# Patient Record
Sex: MALE | Race: WHITE | NOT HISPANIC OR LATINO | Employment: FULL TIME | URBAN - METROPOLITAN AREA
[De-identification: names, ages, dates, MRNs, and addresses within clinical notes are randomized per-mention and may not be internally consistent; named-entity substitution may affect disease eponyms.]

---

## 2017-05-17 ENCOUNTER — OFFICE VISIT (OUTPATIENT)
Dept: FAMILY MEDICINE CLINIC | Facility: CLINIC | Age: 42
End: 2017-05-17

## 2017-05-17 VITALS
WEIGHT: 171 LBS | OXYGEN SATURATION: 98 % | HEIGHT: 72 IN | SYSTOLIC BLOOD PRESSURE: 122 MMHG | BODY MASS INDEX: 23.16 KG/M2 | DIASTOLIC BLOOD PRESSURE: 88 MMHG | HEART RATE: 107 BPM

## 2017-05-17 DIAGNOSIS — S62.339A BOXER'S FRACTURE, CLOSED, INITIAL ENCOUNTER: Primary | ICD-10-CM

## 2017-05-17 PROCEDURE — 99213 OFFICE O/P EST LOW 20 MIN: CPT | Performed by: NURSE PRACTITIONER

## 2017-07-21 ENCOUNTER — OFFICE VISIT (OUTPATIENT)
Dept: FAMILY MEDICINE CLINIC | Facility: CLINIC | Age: 42
End: 2017-07-21

## 2017-07-21 VITALS
DIASTOLIC BLOOD PRESSURE: 70 MMHG | HEIGHT: 72 IN | SYSTOLIC BLOOD PRESSURE: 140 MMHG | BODY MASS INDEX: 23.03 KG/M2 | WEIGHT: 170 LBS | OXYGEN SATURATION: 100 % | HEART RATE: 120 BPM

## 2017-07-21 DIAGNOSIS — R94.31 EKG, ABNORMAL: Primary | ICD-10-CM

## 2017-07-21 DIAGNOSIS — R00.0 TACHYCARDIA: ICD-10-CM

## 2017-07-21 DIAGNOSIS — T67.1XXD HEAT SYNCOPE, SUBSEQUENT ENCOUNTER: ICD-10-CM

## 2017-07-21 DIAGNOSIS — R53.83 OTHER FATIGUE: ICD-10-CM

## 2017-07-21 PROCEDURE — 99214 OFFICE O/P EST MOD 30 MIN: CPT | Performed by: FAMILY MEDICINE

## 2017-07-21 NOTE — PROGRESS NOTES
Subjective   Teddy King is a 41 y.o. male here to follow-up from the ER due to syncope.    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 He was taken to [facility] ER by ambulance. Teddy states this morning when he woke-up he felt intermittment heart palpitations and overall fatigue. He confirms chest pains, but only when the heart palpitations are present.  EKG reviewed and on chart - abnormal.The ED doc wanted him to stay overnight for evaluation but Teddy declined.  He was also dxd with dehydration.  The following portions of the patient's history were reviewed and updated as appropriate: allergies, current medications, past family history, past medical history, past social history and problem list.    Review of Systems   Constitutional: Positive for fatigue.   Cardiovascular: Positive for chest pain and palpitations.   Neurological: Positive for syncope, weakness and light-headedness.   All other systems reviewed and are negative.      Objective   Physical Exam   Constitutional: He is oriented to person, place, and time. He appears well-developed and well-nourished.   HENT:   Head: Normocephalic and atraumatic.   Nose: Nose normal.   Mouth/Throat: Oropharynx is clear and moist.   Eyes: Conjunctivae and EOM are normal. Pupils are equal, round, and reactive to light.   Neck: Normal range of motion. Neck supple. No JVD present. No thyromegaly present.   Cardiovascular: Normal rate, regular rhythm and normal heart sounds.  Exam reveals no gallop and no friction rub.    No murmur heard.  Tachy   Pulmonary/Chest: Breath sounds normal. He exhibits no tenderness.   Abdominal: Soft. He exhibits no distension. There is no tenderness.   Musculoskeletal: Normal range of motion.   Neurological: He is alert and oriented to person, place, and time.   Skin: Skin is warm and dry.   Psychiatric: He has a normal mood and affect. His behavior is normal.    Sad, tearful and anxious   Nursing note and vitals reviewed.      Assessment/Plan   Teddy was seen today for loss of consciousness.  ER record reviewed and on chart.  Diagnoses and all orders for this visit:    EKG, abnormal  Reviewed and part of ER report. I have referred him to cardiology for evaluation.  He has a strong family hx of heart disease.    Heat syncope, subsequent encounter  Advised increasing fluids and staying out of the heat as much as possible.    Other fatigue  Most likely due to above.    Tachycardia  HR of 120 that is regular. He denies having any caffeine today ]

## 2017-08-17 ENCOUNTER — OFFICE VISIT (OUTPATIENT)
Dept: FAMILY MEDICINE CLINIC | Facility: CLINIC | Age: 42
End: 2017-08-17

## 2017-08-17 VITALS
RESPIRATION RATE: 16 BRPM | BODY MASS INDEX: 23.03 KG/M2 | SYSTOLIC BLOOD PRESSURE: 134 MMHG | DIASTOLIC BLOOD PRESSURE: 80 MMHG | OXYGEN SATURATION: 98 % | WEIGHT: 170 LBS | HEART RATE: 106 BPM | HEIGHT: 72 IN

## 2017-08-17 DIAGNOSIS — Z00.00 ROUTINE GENERAL MEDICAL EXAMINATION AT A HEALTH CARE FACILITY: Primary | ICD-10-CM

## 2017-08-17 DIAGNOSIS — R00.2 PALPITATION: ICD-10-CM

## 2017-08-17 DIAGNOSIS — F41.9 ANXIETY: ICD-10-CM

## 2017-08-17 DIAGNOSIS — R00.0 RAPID HEART RATE: ICD-10-CM

## 2017-08-17 DIAGNOSIS — G47.9 DISTURBANCE OF SLEEP: ICD-10-CM

## 2017-08-17 LAB
ALBUMIN SERPL-MCNC: 4.7 G/DL (ref 3.5–5.2)
ALBUMIN/GLOB SERPL: 1.6 G/DL
ALP SERPL-CCNC: 75 U/L (ref 39–117)
ALT SERPL-CCNC: 26 U/L (ref 1–41)
AST SERPL-CCNC: 32 U/L (ref 1–40)
BILIRUB SERPL-MCNC: 0.3 MG/DL (ref 0.1–1.2)
BUN SERPL-MCNC: 12 MG/DL (ref 6–20)
BUN/CREAT SERPL: 16.4 (ref 7–25)
CALCIUM SERPL-MCNC: 9.8 MG/DL (ref 8.6–10.5)
CHLORIDE SERPL-SCNC: 100 MMOL/L (ref 98–107)
CO2 SERPL-SCNC: 27.7 MMOL/L (ref 22–29)
CREAT SERPL-MCNC: 0.73 MG/DL (ref 0.76–1.27)
ERYTHROCYTE [DISTWIDTH] IN BLOOD BY AUTOMATED COUNT: 13.6 % (ref 11.5–14.5)
GLOBULIN SER CALC-MCNC: 3 GM/DL
GLUCOSE SERPL-MCNC: 96 MG/DL (ref 65–99)
HCT VFR BLD AUTO: 42.8 % (ref 40.4–52.2)
HGB BLD-MCNC: 14.4 G/DL (ref 13.7–17.6)
MCH RBC QN AUTO: 34 PG (ref 27–32.7)
MCHC RBC AUTO-ENTMCNC: 33.6 G/DL (ref 32.6–36.4)
MCV RBC AUTO: 101.2 FL (ref 79.8–96.2)
PLATELET # BLD AUTO: 297 10*3/MM3 (ref 140–500)
POTASSIUM SERPL-SCNC: 4.5 MMOL/L (ref 3.5–5.2)
PROT SERPL-MCNC: 7.7 G/DL (ref 6–8.5)
RBC # BLD AUTO: 4.23 10*6/MM3 (ref 4.6–6)
SODIUM SERPL-SCNC: 144 MMOL/L (ref 136–145)
TSH SERPL DL<=0.005 MIU/L-ACNC: 0.75 MIU/ML (ref 0.27–4.2)
WBC # BLD AUTO: 9.56 10*3/MM3 (ref 4.5–10.7)

## 2017-08-17 PROCEDURE — 99213 OFFICE O/P EST LOW 20 MIN: CPT | Performed by: FAMILY MEDICINE

## 2017-08-17 PROCEDURE — 99395 PREV VISIT EST AGE 18-39: CPT | Performed by: FAMILY MEDICINE

## 2017-08-17 RX ORDER — CARVEDILOL 3.12 MG/1
3.12 TABLET ORAL 2 TIMES DAILY WITH MEALS
COMMUNITY
End: 2019-03-06

## 2017-08-17 NOTE — PATIENT INSTRUCTIONS
Annual Wellness  Personal Prevention Plan of Service   I will call you with lab results.  Date of Office Visit:  2017  Encounter Provider:  Mario Poon MD  Place of Service:  [facility]  Patient Name: Teddy King  :  1975    As part of the Annual Wellness portion of your visit today, we are providing you with this personalized preventive plan of services (PPPS). This plan is based upon recommendations of the [place] Preventive Services Task Force (USPSTF) and the Advisory Committee on Immunization Practices (ACIP).    This lists the preventive care services that should be considered, and provides dates of when you are due. Items listed as completed are up-to-date and do not require any further intervention.    Health Maintenance   Topic Date Due   • INFLUENZA VACCINE  2017   • TDAP/TD VACCINES (2 - Td) 2025   • PNEUMOCOCCAL VACCINE (19-64 MEDIUM RISK)  Excluded       Orders Placed This Encounter   Procedures   • CBC (No Diff)     Order Specific Question:   [facility] Has the patient fasted?     Answer:   Yes   • Comprehensive Metabolic Panel     Order Specific Question:   [facility] Has the patient fasted?     Answer:   Yes   • Lipid Panel With / Chol / HDL Ratio   • TSH     Order Specific Question:   [facility] Has the patient fasted?     Answer:   Yes   • Ambulatory Referral to Sleep Medicine     Referral Priority:   Routine     Referral Type:   Consultation     Referral Reason:   Specialty Services Required     Referred to Provider:   Jong Kirkpatrick MD     Requested Specialty:   Sleep Medicine     Number of Visits Requested:   1   • Ambulatory Referral to Psychiatry     Referral Priority:   Routine     Referral Type:   Behavorial Health/Psych     Referral Reason:   Specialty Services Required     Referred to Provider:   Gerhard Cid MD     Requested Specialty:   Psychiatry     Number of Visits Requested:   1       Return in about 6 months (around  2/17/2018) for Recheck.

## 2017-08-17 NOTE — PROGRESS NOTES
"  Subjective   Teddy King  is a 41 y.o. male.   He is here for check up and RHM.     History of Present Illness   He is fasting today for RHM labs.    He is still struggling emotionally and is under a great amount of stress. He would like to get some help for the intense anxiety he feels. He has not worked with a psychiatrist or therapist but is ready to do so.  He was seen by cardiology for rapid heart rate and palpitations and medications were adjusted. He was supposed to get a sleep study but that has not been ordered yet. He feels he does not sleep well and wonders if this is the source of his cardiac problems.    The following portions of the patient's history were reviewed and updated as appropriate: allergies, current medications, past family history, past medical history, past social history, past surgical history and problem list.    Review of Systems   Constitutional: Negative.    HENT: Negative.    Eyes: Negative.    Respiratory: Negative.    Cardiovascular: Negative.    Gastrointestinal: Negative.    Endocrine: Negative.    Genitourinary: Negative.    Musculoskeletal: Negative for neck stiffness.   Skin: Negative.    Allergic/Immunologic: Negative.    Neurological: Negative.    Hematological: Negative.    Psychiatric/Behavioral: Negative.    All other systems reviewed and are negative.      Objective   Vitals:    08/17/17 0927   BP: 134/80   Pulse: 106   Resp: 16   SpO2: 98%   Weight: 170 lb (77.1 kg)   Height: 71.5\" (181.6 cm)     Physical Exam   Constitutional: He is oriented to person, place, and time. He appears well-developed and well-nourished.   HENT:   Head: Normocephalic and atraumatic.   Eyes: Conjunctivae and EOM are normal. Pupils are equal, round, and reactive to light.   Neck: Normal range of motion. Neck supple. No thyromegaly present.   Cardiovascular: Slightly elevated rate, regular rhythm, normal heart sounds and intact distal pulses.  Exam reveals no gallop and no friction rub.    No " murmur heard.  Pulmonary/Chest: Effort normal and breath sounds normal. No respiratory distress. He has no wheezes. He has no rales. He exhibits no tenderness.   Abdominal: Soft, non-tender, non-distended.   Musculoskeletal: Upper and Lower extremities have normal range of motion. He exhibits no edema.   Neurological: He is alert and oriented to person, place, and time.   Skin: Skin is warm and dry. No rash noted.   Psychiatric: He has a normal mood and affect. His behavior is normal. Judgment and thought content normal.   Nursing note and vitals reviewed.      Assessment/Plan   Teddy King was seen today for annual exam.  1. Routine general medical examination at a [facility] care facility  RHM reviewed with Teddy and updated.  - CBC (No Diff)  - Comprehensive Metabolic Panel  - Lipid Panel With / Chol / HDL Ratio    2. Disturbance of sleep  - Ambulatory Referral to Sleep Medicine    3. Anxiety    - Ambulatory Referral to Psychiatry    4. Palpitation and Rapid Heart Rate  TSH will be checked today to r/o thyroid problems.  - TSH  Orders Placed This Encounter   Procedures   • CBC (No Diff)     Order Specific Question:   [facility] Has the patient fasted?     Answer:   Yes   • Comprehensive Metabolic Panel     Order Specific Question:   [facility] Has the patient fasted?     Answer:   Yes   • Lipid Panel With / Chol / HDL Ratio   • TSH     Order Specific Question:   [facility] Has the patient fasted?     Answer:   Yes   • Ambulatory Referral to Sleep Medicine     Referral Priority:   Routine     Referral Type:   Consultation     Referral Reason:   Specialty Services Required     Referred to Provider:   Jong Kirkpatrick MD     Requested Specialty:   Sleep Medicine     Number of Visits Requested:   1   • Ambulatory Referral to Psychiatry     Referral Priority:   Routine     Referral Type:   Behavorial Health/Psych     Referral Reason:   Specialty Services Required     Referred to Provider:   Gerhard Cid MD     Requested  Specialty:   Psychiatry     Number of Visits Requested:   1       Return in about 6 months (around 2/17/2018) for Recheck.

## 2017-08-18 LAB
CHOLEST SERPL-MCNC: 257 MG/DL (ref 0–200)
CHOLEST/HDLC SERPL: 3.29 {RATIO}
HDLC SERPL-MCNC: 78 MG/DL (ref 40–60)
LDLC SERPL CALC-MCNC: ABNORMAL MG/DL
TRIGL SERPL-MCNC: 431 MG/DL (ref 0–150)
VLDLC SERPL CALC-MCNC: ABNORMAL MG/DL

## 2017-09-13 ENCOUNTER — OFFICE VISIT (OUTPATIENT)
Dept: FAMILY MEDICINE CLINIC | Facility: CLINIC | Age: 42
End: 2017-09-13

## 2017-09-13 VITALS
OXYGEN SATURATION: 98 % | HEIGHT: 71 IN | SYSTOLIC BLOOD PRESSURE: 142 MMHG | DIASTOLIC BLOOD PRESSURE: 94 MMHG | BODY MASS INDEX: 23.52 KG/M2 | HEART RATE: 99 BPM | TEMPERATURE: 98.4 F | WEIGHT: 168 LBS

## 2017-09-13 DIAGNOSIS — R19.7 DIARRHEA, UNSPECIFIED TYPE: ICD-10-CM

## 2017-09-13 DIAGNOSIS — R11.2 NON-INTRACTABLE VOMITING WITH NAUSEA, UNSPECIFIED VOMITING TYPE: Primary | ICD-10-CM

## 2017-09-13 PROCEDURE — 99213 OFFICE O/P EST LOW 20 MIN: CPT | Performed by: FAMILY MEDICINE

## 2017-09-13 NOTE — PROGRESS NOTES
Subjective   Teddy King is a 41 y.o. male.     History of Present Illness   Teddy is here today with c/o nausea & vomiting, loose stool, dizziness, and sharp pains in his stomach for the last 3 days. He has taken Tums and Pepto Bismol.  He is much better today.        The following portions of the patient's history were reviewed and updated as appropriate: allergies, current medications, past medical history and past social history.    Review of Systems   Constitutional: Positive for fatigue.   Gastrointestinal: Positive for diarrhea and nausea.       Objective   Physical Exam   Constitutional: He appears well-developed and well-nourished. No distress.   Eyes: EOM are normal. Pupils are equal, round, and reactive to light.   Cardiovascular: Normal rate and regular rhythm.    Pulmonary/Chest: Effort normal and breath sounds normal.   Abdominal: Soft. Bowel sounds are normal. He exhibits no distension. There is no tenderness. There is no rebound and no guarding.   Skin: Skin is warm and dry. No rash noted.   Vitals reviewed.      Assessment/Plan   Teddy was seen today for gi problem and diarrhea.    Diagnoses and all orders for this visit:    Non-intractable vomiting with nausea, unspecified vomiting type    Diarrhea, unspecified type      Patient Instructions   Continue fluids, Tums and pepto. Call me if you need me.

## 2017-09-19 ENCOUNTER — OFFICE VISIT (OUTPATIENT)
Dept: FAMILY MEDICINE CLINIC | Facility: CLINIC | Age: 42
End: 2017-09-19

## 2017-09-19 ENCOUNTER — HOSPITAL ENCOUNTER (EMERGENCY)
Facility: HOSPITAL | Age: 42
Discharge: HOME | End: 2017-09-19
Attending: EMERGENCY MEDICINE | Admitting: EMERGENCY MEDICINE

## 2017-09-19 ENCOUNTER — APPOINTMENT (OUTPATIENT)
Dept: CT IMAGING | Facility: HOSPITAL | Age: 42
End: 2017-09-19

## 2017-09-19 VITALS
DIASTOLIC BLOOD PRESSURE: 72 MMHG | WEIGHT: 166.7 LBS | BODY MASS INDEX: 23.34 KG/M2 | SYSTOLIC BLOOD PRESSURE: 104 MMHG | HEIGHT: 71 IN | HEART RATE: 101 BPM | TEMPERATURE: 98.5 F | OXYGEN SATURATION: 96 %

## 2017-09-19 VITALS
BODY MASS INDEX: 23.24 KG/M2 | DIASTOLIC BLOOD PRESSURE: 95 MMHG | TEMPERATURE: 98.4 F | HEART RATE: 93 BPM | HEIGHT: 71 IN | SYSTOLIC BLOOD PRESSURE: 147 MMHG | WEIGHT: 166 LBS | OXYGEN SATURATION: 96 % | RESPIRATION RATE: 15 BRPM

## 2017-09-19 DIAGNOSIS — R11.2 NON-INTRACTABLE VOMITING WITH NAUSEA, UNSPECIFIED VOMITING TYPE: Primary | ICD-10-CM

## 2017-09-19 DIAGNOSIS — K52.9 COLITIS: Primary | ICD-10-CM

## 2017-09-19 LAB
ALBUMIN SERPL-MCNC: 4.1 G/DL (ref 3.5–5.2)
ALBUMIN/GLOB SERPL: 1.3 G/DL
ALP SERPL-CCNC: 96 U/L (ref 39–117)
ALT SERPL W P-5'-P-CCNC: 18 U/L (ref 1–41)
ANION GAP SERPL CALCULATED.3IONS-SCNC: 14.6 MMOL/L
AST SERPL-CCNC: 33 U/L (ref 1–40)
BACTERIA UR QL AUTO: ABNORMAL /HPF
BASOPHILS # BLD AUTO: 0.02 10*3/MM3 (ref 0–0.2)
BASOPHILS NFR BLD AUTO: 0.1 % (ref 0–1.5)
BILIRUB SERPL-MCNC: 1.4 MG/DL (ref 0.1–1.2)
BILIRUB UR QL STRIP: NEGATIVE
BUN BLD-MCNC: 13 MG/DL (ref 6–20)
BUN/CREAT SERPL: 16.7 (ref 7–25)
CALCIUM SPEC-SCNC: 9.6 MG/DL (ref 8.6–10.5)
CHLORIDE SERPL-SCNC: 95 MMOL/L (ref 98–107)
CLARITY UR: ABNORMAL
CO2 SERPL-SCNC: 27.4 MMOL/L (ref 22–29)
COD CRY URNS QL: ABNORMAL /HPF
COLOR UR: ABNORMAL
CREAT BLD-MCNC: 0.78 MG/DL (ref 0.76–1.27)
DEPRECATED RDW RBC AUTO: 48.4 FL (ref 37–54)
EOSINOPHIL # BLD AUTO: 0.02 10*3/MM3 (ref 0–0.7)
EOSINOPHIL NFR BLD AUTO: 0.1 % (ref 0.3–6.2)
ERYTHROCYTE [DISTWIDTH] IN BLOOD BY AUTOMATED COUNT: 13.6 % (ref 11.5–14.5)
GFR SERPL CREATININE-BSD FRML MDRD: 111 ML/MIN/1.73
GLOBULIN UR ELPH-MCNC: 3.1 GM/DL
GLUCOSE BLD-MCNC: 76 MG/DL (ref 65–99)
GLUCOSE UR STRIP-MCNC: NEGATIVE MG/DL
HCT VFR BLD AUTO: 41.5 % (ref 40.4–52.2)
HGB BLD-MCNC: 14.3 G/DL (ref 13.7–17.6)
HGB UR QL STRIP.AUTO: ABNORMAL
HOLD SPECIMEN: NORMAL
HOLD SPECIMEN: NORMAL
HYALINE CASTS UR QL AUTO: ABNORMAL /LPF
IMM GRANULOCYTES # BLD: 0.04 10*3/MM3 (ref 0–0.03)
IMM GRANULOCYTES NFR BLD: 0.3 % (ref 0–0.5)
KETONES UR QL STRIP: ABNORMAL
LEUKOCYTE ESTERASE UR QL STRIP.AUTO: ABNORMAL
LIPASE SERPL-CCNC: 44 U/L (ref 13–60)
LYMPHOCYTES # BLD AUTO: 1.71 10*3/MM3 (ref 0.9–4.8)
LYMPHOCYTES NFR BLD AUTO: 12.1 % (ref 19.6–45.3)
MCH RBC QN AUTO: 33.7 PG (ref 27–32.7)
MCHC RBC AUTO-ENTMCNC: 34.5 G/DL (ref 32.6–36.4)
MCV RBC AUTO: 97.9 FL (ref 79.8–96.2)
MONOCYTES # BLD AUTO: 1.24 10*3/MM3 (ref 0.2–1.2)
MONOCYTES NFR BLD AUTO: 8.8 % (ref 5–12)
MUCOUS THREADS URNS QL MICRO: ABNORMAL /HPF
NEUTROPHILS # BLD AUTO: 11.11 10*3/MM3 (ref 1.9–8.1)
NEUTROPHILS NFR BLD AUTO: 78.6 % (ref 42.7–76)
NITRITE UR QL STRIP: POSITIVE
PH UR STRIP.AUTO: 5.5 [PH] (ref 5–8)
PLATELET # BLD AUTO: 189 10*3/MM3 (ref 140–500)
PMV BLD AUTO: 9.2 FL (ref 6–12)
POTASSIUM BLD-SCNC: 3.7 MMOL/L (ref 3.5–5.2)
PROT SERPL-MCNC: 7.2 G/DL (ref 6–8.5)
PROT UR QL STRIP: ABNORMAL
RBC # BLD AUTO: 4.24 10*6/MM3 (ref 4.6–6)
RBC # UR: ABNORMAL /HPF
REF LAB TEST METHOD: ABNORMAL
SODIUM BLD-SCNC: 137 MMOL/L (ref 136–145)
SP GR UR STRIP: >=1.03 (ref 1–1.03)
SQUAMOUS #/AREA URNS HPF: ABNORMAL /HPF
UROBILINOGEN UR QL STRIP: ABNORMAL
WBC NRBC COR # BLD: 14.14 10*3/MM3 (ref 4.5–10.7)
WBC UR QL AUTO: ABNORMAL /HPF
WHOLE BLOOD HOLD SPECIMEN: NORMAL
WHOLE BLOOD HOLD SPECIMEN: NORMAL

## 2017-09-19 PROCEDURE — 85025 COMPLETE CBC W/AUTO DIFF WBC: CPT | Performed by: EMERGENCY MEDICINE

## 2017-09-19 PROCEDURE — 36415 COLL VENOUS BLD VENIPUNCTURE: CPT | Performed by: EMERGENCY MEDICINE

## 2017-09-19 PROCEDURE — 96361 HYDRATE IV INFUSION ADD-ON: CPT

## 2017-09-19 PROCEDURE — 99213 OFFICE O/P EST LOW 20 MIN: CPT | Performed by: FAMILY MEDICINE

## 2017-09-19 PROCEDURE — 99284 EMERGENCY DEPT VISIT MOD MDM: CPT

## 2017-09-19 PROCEDURE — 25010000002 KETOROLAC TROMETHAMINE PER 15 MG: Performed by: PHYSICIAN ASSISTANT

## 2017-09-19 PROCEDURE — 80053 COMPREHEN METABOLIC PANEL: CPT | Performed by: EMERGENCY MEDICINE

## 2017-09-19 PROCEDURE — 83690 ASSAY OF LIPASE: CPT | Performed by: EMERGENCY MEDICINE

## 2017-09-19 PROCEDURE — 96374 THER/PROPH/DIAG INJ IV PUSH: CPT

## 2017-09-19 PROCEDURE — 81001 URINALYSIS AUTO W/SCOPE: CPT | Performed by: EMERGENCY MEDICINE

## 2017-09-19 PROCEDURE — 0 IOPAMIDOL 61 % SOLUTION: Performed by: EMERGENCY MEDICINE

## 2017-09-19 PROCEDURE — 74177 CT ABD & PELVIS W/CONTRAST: CPT

## 2017-09-19 PROCEDURE — 87086 URINE CULTURE/COLONY COUNT: CPT | Performed by: EMERGENCY MEDICINE

## 2017-09-19 RX ORDER — PROMETHAZINE HYDROCHLORIDE 25 MG/1
25 TABLET ORAL EVERY 6 HOURS PRN
Qty: 20 TABLET | Refills: 0 | Status: SHIPPED | OUTPATIENT
Start: 2017-09-19 | End: 2019-03-06

## 2017-09-19 RX ORDER — ONDANSETRON 4 MG/1
4 TABLET, FILM COATED ORAL EVERY 8 HOURS PRN
Qty: 30 TABLET | Refills: 1 | Status: SHIPPED | OUTPATIENT
Start: 2017-09-19 | End: 2019-03-06

## 2017-09-19 RX ORDER — KETOROLAC TROMETHAMINE 30 MG/ML
30 INJECTION, SOLUTION INTRAMUSCULAR; INTRAVENOUS ONCE
Status: COMPLETED | OUTPATIENT
Start: 2017-09-19 | End: 2017-09-19

## 2017-09-19 RX ORDER — SODIUM CHLORIDE 0.9 % (FLUSH) 0.9 %
10 SYRINGE (ML) INJECTION AS NEEDED
Status: DISCONTINUED | OUTPATIENT
Start: 2017-09-19 | End: 2017-09-19 | Stop reason: HOSPADM

## 2017-09-19 RX ORDER — CEPHALEXIN 500 MG/1
500 CAPSULE ORAL 3 TIMES DAILY
Qty: 21 CAPSULE | Refills: 0 | Status: SHIPPED | OUTPATIENT
Start: 2017-09-19 | End: 2019-03-06

## 2017-09-19 RX ORDER — METRONIDAZOLE 500 MG/1
500 TABLET ORAL 3 TIMES DAILY
Qty: 21 TABLET | Refills: 0 | Status: SHIPPED | OUTPATIENT
Start: 2017-09-19 | End: 2019-03-06

## 2017-09-19 RX ORDER — HYDROCODONE BITARTRATE AND ACETAMINOPHEN 7.5; 325 MG/1; MG/1
1 TABLET ORAL EVERY 6 HOURS PRN
Qty: 20 TABLET | Refills: 0 | Status: SHIPPED | OUTPATIENT
Start: 2017-09-19 | End: 2019-03-06

## 2017-09-19 RX ADMIN — SODIUM CHLORIDE 1000 ML: 9 INJECTION, SOLUTION INTRAVENOUS at 16:58

## 2017-09-19 RX ADMIN — IOPAMIDOL 85 ML: 612 INJECTION, SOLUTION INTRAVENOUS at 17:43

## 2017-09-19 RX ADMIN — KETOROLAC TROMETHAMINE 30 MG: 30 INJECTION, SOLUTION INTRAMUSCULAR at 18:26

## 2017-09-19 NOTE — ED PROVIDER NOTES
The patient presents complaining of L sided abdominal pain. He also c/o of fever, but denies chills, trouble urinating, or back pain.     Limited physical exam:  Patient is nontoxic appearing awake and alert.   Lungs/cardiovascular: Heart is RRR, Lungs are clear.   Abdomen: Pt has LLQ tenderness, no rebound, guarding or mass, and normal active bowel sounds.     CT shows colitis. Will treat with antibiotics.     I supervised care provided by the midlevel provider.  We have discussed this patient's history, physical exam, and treatment plan.  I have reviewed the note and personally saw and examined the patient and agree with the plan of care.    Documentation assistance provided by francisco javier Rhodes for Dr. Ricci Welsh MD.  Information recorded by the scribe was done at my direction and has been verified and validated by me.           Brandie Mi  09/19/17 0497       Ricci Welsh MD  09/19/17 9299

## 2017-09-19 NOTE — PROGRESS NOTES
Subjective   Teddy King is a 41 y.o. male.     History of Present Illness   Teddy is here for abdominal pain and nausea.He had a slight fever of around 100, but now it is 98.5. He has had vomiting around 5 times yesterday . He states he is very fatigue and has not ate anything, but has had some gingerale this morning.He has now had symptoms for over a week. OTC meds have not helped.    The following portions of the patient's history were reviewed and updated as appropriate: allergies, current medications, past medical history, past social history and problem list.    Review of Systems   Constitutional: Positive for appetite change, fatigue and fever.   Gastrointestinal: Positive for abdominal pain, nausea and vomiting. Negative for blood in stool, constipation and diarrhea.   All other systems reviewed and are negative.      Objective   Physical Exam   Constitutional: He appears well-developed and well-nourished. No distress.   Eyes: EOM are normal. Pupils are equal, round, and reactive to light.   Cardiovascular: Normal rate and regular rhythm.    Pulmonary/Chest: Effort normal and breath sounds normal.   Abdominal: Soft. Bowel sounds are normal. He exhibits no distension. There is no tenderness. There is no rebound and no guarding.   Skin: Skin is warm and dry. No rash noted.   Vitals reviewed.      Assessment/Plan   Teddy was seen today for abdominal pain, vomiting, fatigue and fever.    Diagnoses and all orders for this visit:    Non-intractable vomiting with nausea, unspecified vomiting type  -     ondansetron (ZOFRAN) 4 MG tablet; Take 1 tablet by mouth Every 8 (Eight) Hours As Needed for Nausea or Vomiting.      I am concerned by the lack of resolution of his symptoms and advised him to go to the ER for evaluation.

## 2017-09-19 NOTE — ED PROVIDER NOTES
EMERGENCY DEPARTMENT ENCOUNTER    CHIEF COMPLAINT  Chief Complaint: Abdominal Pain  History given by: Patient  History limited by: Nothing  Room Number: 01/01  PMD: Mario Poon MD      HPI:  Pt is a 41 y.o. male who presents to the ED complaining of atraumatic left sided abdominal pain which he initially noticed approximately 6 days ago. The patient has experienced nausea and intermittent episodes of emesis / diarrhea production since onset. He states that he's also had decreased appetite but denies any recent fever, chills, hematochezia, hematemesis, chest pain or SOA since onset. He explains that the pain is exacerbated with palpation and alleviated with rest / time. The patient decided to see his PCP earlier today and he was referred to the ED for further evaluation of his persistent LLQ pain. Patient denies any h/o diverticulosis / diverticulitis or any previous abdominal surgeries. He currently rates the pain as 7/10 and he has no other complaints at this time.    Duration:  6 days  Onset: Gradual  Timing: Intermittent  Location: Left abdomen  Radiation: None  Quality: Sharp  Intensity/Severity: Moderate  Progression: Persistent   Associated Symptoms: Abdominal pain, nausea, vomiting, , decreased appetite   Aggravating Factors: Palpation  Alleviating Factors: Rest / time  Previous Episodes: None  Treatment before arrival: None    PAST MEDICAL HISTORY  Active Ambulatory Problems     Diagnosis Date Noted   • Palpitation 08/17/2017   • Rapid heart rate 08/17/2017     Resolved Ambulatory Problems     Diagnosis Date Noted   • No Resolved Ambulatory Problems     Past Medical History:   Diagnosis Date   • Heat stroke    • Palpitation    • Rapid heart rate        PAST SURGICAL HISTORY  History reviewed. No pertinent surgical history.    FAMILY HISTORY  Family History   Problem Relation Age of Onset   • Hyperlipidemia Father    • Hypertension Father    • No Known Problems Sister    • Hypertension Brother    •  Diabetes Brother    • Schizophrenia Brother        SOCIAL HISTORY  Social History     Social History   • Marital status:      Spouse name: N/A   • Number of children: N/A   • Years of education: N/A     Occupational History   • Not on file.     Social History Main Topics   • Smoking status: Current Every Day Smoker     Packs/day: 0.70   • Smokeless tobacco: Never Used   • Alcohol use Yes      Comment: 2/week   • Drug use: No   • Sexual activity: Defer     Other Topics Concern   • Not on file     Social History Narrative   • No narrative on file       ALLERGIES  Review of patient's allergies indicates no known allergies.    REVIEW OF SYSTEMS  Review of Systems   Constitutional: Positive for appetite change (decreased). Negative for chills and fever.   HENT: Negative.  Negative for sore throat.    Eyes: Negative.    Respiratory: Negative.  Negative for cough.    Cardiovascular: Negative.  Negative for chest pain.   Gastrointestinal: Positive for abdominal pain, diarrhea, nausea and vomiting.   Genitourinary: Negative.  Negative for dysuria.   Musculoskeletal: Negative.  Negative for back pain.   Skin: Negative.  Negative for rash.   Neurological: Negative.  Negative for headaches.       PHYSICAL EXAM  ED Triage Vitals   Temp Heart Rate Resp BP SpO2   09/19/17 1447 09/19/17 1447 09/19/17 1535 09/19/17 1535 09/19/17 1447   98.4 °F (36.9 °C) 119 18 144/97 100 %      Temp src Heart Rate Source Patient Position BP Location FiO2 (%)   -- -- -- -- --              Physical Exam   Constitutional: He is oriented to person, place, and time. No distress.   HENT:   Head: Normocephalic and atraumatic.   Cardiovascular: Normal rate and regular rhythm.    Pulmonary/Chest: Effort normal.   Abdominal: Soft. There is generalized tenderness (worse at LLQ) and tenderness in the left lower quadrant. There is no rebound and no guarding.   Musculoskeletal: He exhibits no tenderness.   Neurological: He is alert and oriented to person,  place, and time.   Skin: No rash noted.   Nursing note and vitals reviewed.      LAB RESULTS  Lab Results (last 24 hours)     Procedure Component Value Units Date/Time    CBC & Differential [[id number]] Collected:  09/19/17 1546    Specimen:  Blood Updated:  09/19/17 1606    Narrative:       The following orders were created for panel order CBC & Differential.  Procedure                               Abnormality         Status                     ---------                               -----------         ------                     CBC Auto Differential[[id number]]         Abnormal            Final result                 Please view results for these tests on the individual orders.    Comprehensive Metabolic Panel [[id number]]  (Abnormal) Collected:  09/19/17 1546    Specimen:  Blood Updated:  09/19/17 1626     Glucose 76 mg/dL      BUN 13 mg/dL      Creatinine 0.78 mg/dL      Sodium 137 mmol/L      Potassium 3.7 mmol/L      Chloride 95 (L) mmol/L      CO2 27.4 mmol/L      Calcium 9.6 mg/dL      Total Protein 7.2 g/dL      Albumin 4.10 g/dL      ALT (SGPT) 18 U/L      AST (SGOT) 33 U/L      Alkaline Phosphatase 96 U/L      Total Bilirubin 1.4 (H) mg/dL      eGFR Non African Amer 111 mL/min/1.73      Globulin 3.1 gm/dL      A/G Ratio 1.3 g/dL      BUN/Creatinine Ratio 16.7     Anion Gap 14.6 mmol/L     Lipase [[id number]]  (Normal) Collected:  09/19/17 1546    Specimen:  Blood Updated:  09/19/17 1626     Lipase 44 U/L     CBC Auto Differential [[id number]]  (Abnormal) Collected:  09/19/17 1546    Specimen:  Blood Updated:  09/19/17 1606     WBC 14.14 (H) 10*3/mm3      RBC 4.24 (L) 10*6/mm3      Hemoglobin 14.3 g/dL      Hematocrit 41.5 %      MCV 97.9 (H) fL      MCH 33.7 (H) pg      MCHC 34.5 g/dL      RDW 13.6 %      RDW-SD 48.4 fl      MPV 9.2 fL      Platelets 189 10*3/mm3      Neutrophil % 78.6 (H) %      Lymphocyte % 12.1 (L) %      Monocyte % 8.8 %      Eosinophil % 0.1 (L) %      Basophil % 0.1 %      Immature Grans %  0.3 %      Neutrophils, Absolute 11.11 (H) 10*3/mm3      Lymphocytes, Absolute 1.71 10*3/mm3      Monocytes, Absolute 1.24 (H) 10*3/mm3      Eosinophils, Absolute 0.02 10*3/mm3      Basophils, Absolute 0.02 10*3/mm3      Immature Grans, Absolute 0.04 (H) 10*3/mm3     Urinalysis With / Culture If Indicated [[id number]]  (Abnormal) Collected:  09/19/17 1548    Specimen:  Urine from Urine, Clean Catch Updated:  09/19/17 1625     Color, UA Maris (A)     Appearance, UA Cloudy (A)     pH, UA 5.5     Specific Gravity, UA >=1.030     Glucose, UA Negative     Ketones, UA Trace (A)     Bilirubin, UA Negative     Blood, UA Large (3+) (A)     Protein, UA 30 mg/dL (1+) (A)     Leuk Esterase, UA Small (1+) (A)     Nitrite, UA Positive (A)     Urobilinogen, UA 0.2 E.U./dL    Urinalysis, Microscopic Only [[id number]]  (Abnormal) Collected:  09/19/17 1548    Specimen:  Urine from Urine, Clean Catch Updated:  09/19/17 1706     RBC, UA 31-50 (A) /HPF      WBC, UA 6-12 (A) /HPF      Bacteria, UA None Seen /HPF      Squamous Epithelial Cells, UA 0-2 /HPF      Hyaline Casts, UA 3-6 /LPF      Calcium Oxalate Crystals, UA Moderate/2+ /HPF      Mucus, UA Large/3+ (A) /HPF      Methodology Manual Light Microscopy    Urine Culture [[id number]] Collected:  09/19/17 1548    Specimen:  Urine from Urine, Clean Catch Updated:  09/19/17 1621          I ordered the above labs and reviewed the results    RADIOLOGY  CT Abdomen Pelvis With Contrast            1814  Reviewed CT abd/pel - Mild colitis of the descending and sigmoid colon. Ureter and kidnies are well visualized with no acute abnormalities. Independently viewed by me. Interpreted by radiologist. Discussed with Radiologist    I ordered the above noted radiological studies. Interpreted by radiologist. Discussed with radiologist. Reviewed by me in PACS.       PROCEDURES  Procedures      PROGRESS AND CONSULTS  ED Course     1537  Ordered Labs for further evaluation.     1650  Ordered CT abd/pel for  further evaluation. Also ordered IVF for hydration.    1740  Discussed case with  and he agrees with the treatment plan.      1854  Rechecked the patient and he is resting comfortably. Pt handling food and ginger ale.  Discussed the patient's pertinent imaging results, including CT abd/pel shows colitis of his descending and sigmoid colon. Discussed plan to discharge the patient home with Flagyl, Keflex and Phenergan and recommended follow up with  (PCP) for further evaluation if his symptoms persist. Explained that the patient should return if his symptoms worsen or if he develops a fever. Patient agrees with the plan and all questions were addressed.     Latest vital signs   BP- 156/99 HR- 84 Temp- 98.4 °F (36.9 °C) O2 sat- 96%    MEDICAL DECISION MAKING  Results were reviewed/discussed with the patient and they were also made aware of online access. Pt also made aware that some labs, such as cultures, will not be resulted during ER visit and follow up with PMD is necessary.     MDM  Number of Diagnoses or Management Options     Amount and/or Complexity of Data Reviewed  Clinical lab tests: reviewed and ordered (WBC 14.14. UA RBC 31-50)  Tests in the radiology section of CPT®: reviewed and ordered (CT abd/pel - Mild colitis of the descending and sigmoid colon. Ureter and kidnies well visualized and no acute abnormalities.)  Discussion of test results with the performing providers: yes  Decide to obtain previous medical records or to obtain history from someone other than the patient: yes  Review and summarize past medical records: yes (Seen by PCP 9/13 and 9/19 for LLQ pain. Sent to the ED today for further evaluation. )    Patient Progress  Patient progress: stable         DIAGNOSIS  Final diagnoses:   Colitis       DISPOSITION  DISCHARGE    Patient discharged in stable condition.    Reviewed implications of results, diagnosis, meds, responsibility to follow up, warning signs and symptoms of  possible worsening, potential complications and reasons to return to ER, including development of a fever.     Patient/Family voiced understanding of above instructions.    Discussed plan for discharge, as there is no emergent indication for admission.  Pt/family is agreeable and understands need for follow up and repeat testing.  Pt is aware that discharge does not mean that nothing is wrong but it indicates no emergency is present that requires admission and they must continue care with follow-up as given below or physician of their choice.     FOLLOW-UP  Mario Poon MD  [address] [id number] [phone]    In 3 days  for recheck of symptoms         Medication List      New Prescriptions          cephalexin 500 MG capsule   Commonly known as:  KEFLEX   Take 1 capsule by mouth 3 (Three) Times a Day.       metroNIDAZOLE 500 MG tablet   Commonly known as:  FLAGYL   Take 1 tablet by mouth 3 (Three) Times a Day.       promethazine 25 MG tablet   Commonly known as:  PHENERGAN   Take 1 tablet by mouth Every 6 (Six) Hours As Needed for Nausea or   Vomiting.         Stop          ondansetron 4 MG tablet   Commonly known as:  ZOFRAN           Latest Documented Vital Signs:  As of 6:46 PM  BP- 156/99 HR- 84 Temp- 98.4 °F (36.9 °C) O2 sat- 96%    --  Documentation assistance provided by francisco javier Martinez for Gerhard Martin PA-C.  Information recorded by the scribe was done at my direction and has been verified and validated by me.              Dillon Martinez  09/19/17 1546       PRINCE Youssef  09/20/17 0035

## 2017-09-20 ENCOUNTER — TELEPHONE (OUTPATIENT)
Dept: SOCIAL WORK | Facility: HOSPITAL | Age: 42
End: 2017-09-20

## 2017-09-21 LAB
BACTERIA SPEC AEROBE CULT: NORMAL
BACTERIA SPEC AEROBE CULT: NORMAL

## 2017-10-16 ENCOUNTER — APPOINTMENT (OUTPATIENT)
Dept: SLEEP MEDICINE | Facility: HOSPITAL | Age: 42
End: 2017-10-16
Attending: INTERNAL MEDICINE

## 2019-03-06 ENCOUNTER — OFFICE VISIT (OUTPATIENT)
Dept: FAMILY MEDICINE CLINIC | Facility: CLINIC | Age: 44
End: 2019-03-06

## 2019-03-06 VITALS
SYSTOLIC BLOOD PRESSURE: 148 MMHG | WEIGHT: 175 LBS | HEIGHT: 72 IN | BODY MASS INDEX: 23.7 KG/M2 | OXYGEN SATURATION: 98 % | DIASTOLIC BLOOD PRESSURE: 98 MMHG | HEART RATE: 112 BPM

## 2019-03-06 DIAGNOSIS — R42 VERTIGO: Primary | ICD-10-CM

## 2019-03-06 PROCEDURE — 99213 OFFICE O/P EST LOW 20 MIN: CPT | Performed by: NURSE PRACTITIONER

## 2019-03-06 NOTE — PATIENT INSTRUCTIONS
Vertigo  Vertigo means that you feel like you are moving when you are not. Vertigo can also make you feel like things around you are moving when they are not. This feeling can come and go at any time. Vertigo often goes away on its own.  Follow these instructions at home:  · Avoid making fast movements.  · Avoid driving.  · Avoid using heavy machinery.  · Avoid doing any task or activity that might cause danger to you or other people if you would have a vertigo attack while you are doing it.  · Sit down right away if you feel dizzy or have trouble with your balance.  · Take over-the-counter and prescription medicines only as told by your doctor.  · Follow instructions from your doctor about which positions or movements you should avoid.  · Drink enough fluid to keep your pee (urine) clear or pale yellow.  · Keep all follow-up visits as told by your doctor. This is important.  Contact a doctor if:  · Medicine does not help your vertigo.  · You have a fever.  · Your problems get worse or you have new symptoms.  · Your family or friends see changes in your behavior.  · You feel sick to your stomach (nauseous) or you throw up (vomit).  · You have a “pins and needles” feeling or you are numb in part of your body.  Get help right away if:  · You have trouble moving or talking.  · You are always dizzy.  · You pass out (faint).  · You get very bad headaches.  · You feel weak or have trouble using your hands, arms, or legs.  · You have changes in your hearing.  · You have changes in your seeing (vision).  · You get a stiff neck.  · Bright light starts to bother you.  This information is not intended to replace advice given to you by your health care provider. Make sure you discuss any questions you have with your health care provider.  Document Released: 09/26/2009 Document Revised: 05/25/2017 Document Reviewed: 04/11/2016  Elsevier Interactive Patient Education © 2018 [employer]

## 2019-03-06 NOTE — PROGRESS NOTES
Subjective Teddy has had vertigo for about 2 weeks, he may have had the flu 2 weeks ago, he had symptoms of body aches, fever, and a cough.Had it a couple of years ago,ended up in the hospital and they wanted to be kept overnight but he refused  Has not gone to work for the last 3 days  Teddy King is a 43 y.o. male.     History of Present Illness     The following portions of the patient's history were reviewed and updated as appropriate: allergies, current medications, past family history, past medical history, past social history, past surgical history and problem list.    Review of Systems   Constitutional: Positive for appetite change.   Respiratory: Negative for cough.    Gastrointestinal: Positive for nausea.   Neurological: Positive for dizziness.       Objective   Physical Exam   Constitutional: He is oriented to person, place, and time. He appears well-developed and well-nourished.   HENT:   Head: Normocephalic and atraumatic.   Right Ear: External ear normal.   Left Ear: External ear normal.   Mouth/Throat: Oropharynx is clear and moist.   Eyes: Conjunctivae and EOM are normal. Pupils are equal, round, and reactive to light.   Neck: Neck supple.   Cardiovascular: Normal rate and regular rhythm.   Pulmonary/Chest: Effort normal and breath sounds normal. No respiratory distress.   Abdominal: Bowel sounds are normal.   Musculoskeletal: Normal range of motion.   Lymphadenopathy:     He has no cervical adenopathy.   Neurological: He is alert and oriented to person, place, and time.   Skin: Skin is warm and dry.   Psychiatric: He has a normal mood and affect.   Nursing note and vitals reviewed.        Assessment/Plan   Teddy was seen today for dizziness.    Diagnoses and all orders for this visit:    Vertigo  -     Comprehensive Metabolic Panel  -     Lipid Panel With / Chol / HDL Ratio  -     Ambulatory Referral to ENT (Otolaryngology)      Suggested a follow up with a physical with Dr Poon in 4 weeks

## 2019-03-07 LAB
ALBUMIN SERPL-MCNC: 4.5 G/DL (ref 3.5–5.2)
ALBUMIN/GLOB SERPL: 1.6 G/DL
ALP SERPL-CCNC: 70 U/L (ref 39–117)
ALT SERPL-CCNC: 65 U/L (ref 1–41)
AST SERPL-CCNC: 73 U/L (ref 1–40)
BILIRUB SERPL-MCNC: 0.7 MG/DL (ref 0.1–1.2)
BUN SERPL-MCNC: 8 MG/DL (ref 6–20)
BUN/CREAT SERPL: 11.8 (ref 7–25)
CALCIUM SERPL-MCNC: 9.7 MG/DL (ref 8.6–10.5)
CHLORIDE SERPL-SCNC: 99 MMOL/L (ref 98–107)
CHOLEST SERPL-MCNC: 205 MG/DL (ref 0–200)
CHOLEST/HDLC SERPL: 1.97 {RATIO}
CO2 SERPL-SCNC: 30.2 MMOL/L (ref 22–29)
CREAT SERPL-MCNC: 0.68 MG/DL (ref 0.76–1.27)
GLOBULIN SER CALC-MCNC: 2.8 GM/DL
GLUCOSE SERPL-MCNC: 102 MG/DL (ref 65–99)
HDLC SERPL-MCNC: 104 MG/DL (ref 40–60)
LDLC SERPL CALC-MCNC: 70 MG/DL (ref 0–100)
POTASSIUM SERPL-SCNC: 4.1 MMOL/L (ref 3.5–5.2)
PROT SERPL-MCNC: 7.3 G/DL (ref 6–8.5)
SODIUM SERPL-SCNC: 144 MMOL/L (ref 136–145)
TRIGL SERPL-MCNC: 154 MG/DL (ref 0–150)
VLDLC SERPL CALC-MCNC: 30.8 MG/DL (ref 5–40)

## 2019-03-13 ENCOUNTER — OFFICE VISIT (OUTPATIENT)
Dept: FAMILY MEDICINE CLINIC | Facility: CLINIC | Age: 44
End: 2019-03-13

## 2019-03-13 VITALS
HEIGHT: 72 IN | RESPIRATION RATE: 16 BRPM | SYSTOLIC BLOOD PRESSURE: 140 MMHG | OXYGEN SATURATION: 99 % | BODY MASS INDEX: 23.43 KG/M2 | WEIGHT: 173 LBS | DIASTOLIC BLOOD PRESSURE: 84 MMHG | HEART RATE: 80 BPM

## 2019-03-13 DIAGNOSIS — R42 VERTIGO: ICD-10-CM

## 2019-03-13 DIAGNOSIS — R07.9 CHEST PAIN, UNSPECIFIED TYPE: ICD-10-CM

## 2019-03-13 DIAGNOSIS — I10 ESSENTIAL HYPERTENSION: Primary | ICD-10-CM

## 2019-03-13 DIAGNOSIS — R00.2 PALPITATION: ICD-10-CM

## 2019-03-13 PROCEDURE — 99214 OFFICE O/P EST MOD 30 MIN: CPT | Performed by: FAMILY MEDICINE

## 2019-03-13 RX ORDER — METOPROLOL SUCCINATE 25 MG/1
25 TABLET, EXTENDED RELEASE ORAL DAILY
Qty: 30 TABLET | Refills: 2 | Status: SHIPPED | OUTPATIENT
Start: 2019-03-13

## 2019-03-13 NOTE — PROGRESS NOTES
Subjective   Teddy King is a 43 y.o. male.     History of Present Illness   Teddy is here w/ c/o vertigo.  He saw  3/6/19.  He states he has been dizzy for about 2 weeks.His symptoms come and go. He feels it worse when lying in bed. Nothing makes it better.     Teddy also states his bp has been elevated and he has noticed some palpitations.He is very concerned about his blood pressure. It was high at his last office visit as well.   Teddy states hypertension and heart disease are in his family hx.  He feels like he has had some pressure in his chest today. He denies SOA, nausea or increased pain with exertion. He is worried because of a strong family hx of heart disease.     The following portions of the patient's history were reviewed and updated as appropriate: allergies, current medications, past family history, past medical history, past social history, past surgical history and problem list.    Review of Systems   Constitutional: Positive for appetite change and fatigue.   HENT: Positive for tinnitus.    Respiratory: Positive for shortness of breath.    Cardiovascular: Positive for palpitations.   Gastrointestinal: Positive for abdominal distention and nausea.   Neurological: Positive for dizziness, weakness and numbness.        Balance   Psychiatric/Behavioral: Positive for sleep disturbance.   All other systems reviewed and are negative.      Objective   Physical Exam   Constitutional: He is oriented to person, place, and time. He appears well-developed and well-nourished.   HENT:   Head: Normocephalic and atraumatic.   Eyes: EOM are normal. Pupils are equal, round, and reactive to light.   Neck: Normal range of motion.   Cardiovascular: Normal rate, regular rhythm, normal heart sounds and intact distal pulses. Exam reveals no friction rub.   No murmur heard.  Pulmonary/Chest: Effort normal and breath sounds normal. No respiratory distress. He has no wheezes.   Abdominal: Soft. Bowel sounds are normal.    Musculoskeletal: Normal range of motion.   Neurological: He is alert and oriented to person, place, and time.   Skin: Skin is warm and dry. No rash noted.   Psychiatric: He has a normal mood and affect. His behavior is normal.   Nursing note and vitals reviewed.        Assessment/Plan   Teddy was seen today for dizziness.    Diagnoses and all orders for this visit:    Essential hypertension\  I have started Teddy on a low dose of metoprolol and he will f/u with me in 2 weeks.  -     metoprolol succinate XL (TOPROL-XL) 25 MG 24 hr tablet; Take 1 tablet by mouth Daily.  -     TSH  -     Ambulatory Referral to Cardiology    Palpitation  Referred to cardiology and I think the beta blocker should help.  -     metoprolol succinate XL (TOPROL-XL) 25 MG 24 hr tablet; Take 1 tablet by mouth Daily.  -     TSH  -     Ambulatory Referral to Cardiology    Chest pain, unspecified type  -     Ambulatory Referral to Cardiology    Vertigo  Will try again to get him in to ENT  -     Ambulatory Referral to ENT (Otolaryngology)

## 2019-03-13 NOTE — PATIENT INSTRUCTIONS
I have referred you to  for evaluation of vertigo.    I have started you on metoprolol 25 mg a day for blood pressure and heart rate.    I will call you with lab results.,

## 2019-03-14 LAB — TSH SERPL DL<=0.005 MIU/L-ACNC: 2.29 UIU/ML (ref 0.45–4.5)

## 2019-03-25 ENCOUNTER — TELEPHONE (OUTPATIENT)
Dept: FAMILY MEDICINE CLINIC | Facility: CLINIC | Age: 44
End: 2019-03-25

## 2019-03-25 NOTE — TELEPHONE ENCOUNTER
Pt called in to know if we receive the Short Term disability claim from [employer]. States they told him they are trying to contact us since Wednesday, and our fax was not receiving right.   Copies faxed to us Friday at noon.

## 2019-03-25 NOTE — TELEPHONE ENCOUNTER
Called pt and informed him that we receive the copies on Friday. He says [employer] told him needs to be done in one day. I'd let pt know  that Dr. Poon will be here at the office tomorrow, and will work on it as soon as we can.   Forms over Kamila's desk.

## 2019-03-26 ENCOUNTER — OFFICE VISIT (OUTPATIENT)
Dept: CARDIOLOGY | Facility: CLINIC | Age: 44
End: 2019-03-26

## 2019-03-26 VITALS
WEIGHT: 174.4 LBS | HEIGHT: 72 IN | HEART RATE: 96 BPM | OXYGEN SATURATION: 98 % | DIASTOLIC BLOOD PRESSURE: 102 MMHG | SYSTOLIC BLOOD PRESSURE: 148 MMHG | BODY MASS INDEX: 23.62 KG/M2

## 2019-03-26 DIAGNOSIS — R00.2 PALPITATION: ICD-10-CM

## 2019-03-26 DIAGNOSIS — I10 ESSENTIAL HYPERTENSION: Primary | ICD-10-CM

## 2019-03-26 PROCEDURE — 93000 ELECTROCARDIOGRAM COMPLETE: CPT | Performed by: INTERNAL MEDICINE

## 2019-03-26 PROCEDURE — 99203 OFFICE O/P NEW LOW 30 MIN: CPT | Performed by: INTERNAL MEDICINE

## 2019-03-26 NOTE — PROGRESS NOTES
Date of Office Visit: 2019  Encounter Provider: Nirmal Singh MD  Place of Service: [facility] CARDIOLOGY  Patient Name: Teddy King  :1975  Mario Poon MD    Chief Complaint   Patient presents with   • Establish Care     CP, HTN and palps     History of Present Illness  The patient is a 43-year-old white male who was referred by Dr. Mario Poon for evaluation of chest pain, palpitations as well as hypertension.    The patient's cardiac history seems to date back at least to .  At that time he was admitted to [facility] with symptoms of heat stroke.  An echocardiogram at that time was performed which indicates that he has significant left ventricular dysfunction.  His ejection fraction was reported at around 25%.  He also had borderline left ventricular enlargement.  His last visit was in 2017.  The patient has not been on any medication nor did he have any follow-up.    Patient was recently seen in Dr. Poon's office for complaints of vertigo.  He was also noted to have significant elevation in his blood pressure.  He was prescribed metoprolol but has not taken the medication yet.  He claims that he cannot afford the medication.    Symptomatically he describes of shortness of breath, intermittent dizziness, lower extremity edema, palpitations, easy fatigability and chest discomfort.  The shortness of breath in part may be related to anxiety which she admits to having.  He also complains of intermittent vertigo.  He is now on medical leave from [facility] because of the vertical.  There may be some association with his elevated blood pressure and how he feels.  He also describes palpitations that occur throughout the day and into the evening.    Past Medical History:   Diagnosis Date   • Chest pain    • Chronic systolic CHF (congestive heart failure) (CMS/HCC)    • Heat stroke    • Hypertension    • Palpitation    • Rapid heart rate    • Sleep  "apnea    • Syncope    • Tachycardia    • Vertigo          Past Surgical History:   Procedure Laterality Date   • EAR TUBES     • WISDOM TOOTH EXTRACTION             Current Outpatient Medications:   •  metoprolol succinate XL (TOPROL-XL) 25 MG 24 hr tablet, Take 1 tablet by mouth Daily., Disp: 30 tablet, Rfl: 2      Social History     Socioeconomic History   • Marital status:      Spouse name: Not on file   • Number of children: 1   • Years of education: Not on file   • Highest education level: Not on file   Tobacco Use   • Smoking status: Current Every Day Smoker     Packs/day: 0.25   • Smokeless tobacco: Never Used   • Tobacco comment: no caffeine    Substance and Sexual Activity   • Alcohol use: No     Frequency: Never   • Drug use: No   • Sexual activity: Defer         Review of Systems   Constitution: Positive for malaise/fatigue.   HENT: Negative.    Eyes: Negative.    Cardiovascular: Positive for chest pain and palpitations.   Respiratory: Negative.    Endocrine: Negative.    Skin: Negative.    Musculoskeletal: Negative.    Gastrointestinal: Negative.    Neurological: Negative.    Psychiatric/Behavioral: Negative.        Procedures      ECG 12 Lead  Date/Time: 3/26/2019 11:14 AM  Performed by: Nirmal Singh MD  Authorized by: Nirmal Singh MD   Comparison: compared with previous ECG from 7/20/2017  Rhythm: sinus rhythm  Rate: normal  QRS axis: normal  Other findings: left ventricular hypertrophy with strain                Objective:    BP (!) 148/102 (BP Location: Left arm, Patient Position: Sitting, Cuff Size: Adult)   Pulse 96   Ht 181.6 cm (71.5\")   Wt 79.1 kg (174 lb 6.4 oz)   SpO2 98%   BMI 23.98 kg/m²         Physical Exam   Constitutional: He is oriented to person, place, and time. He appears well-developed and well-nourished.   HENT:   Head: Normocephalic.   Eyes: Pupils are equal, round, and reactive to light.   Neck: Normal range of motion. No JVD present. Carotid bruit is " not present. No thyromegaly present.   Cardiovascular: Normal rate, regular rhythm, S1 normal, S2 normal, normal heart sounds and intact distal pulses. Exam reveals no gallop and no friction rub.   No murmur heard.  Pulmonary/Chest: Effort normal and breath sounds normal.   Abdominal: Soft. Bowel sounds are normal.   Musculoskeletal: He exhibits no edema.   Neurological: He is alert and oriented to person, place, and time.   Skin: Skin is warm, dry and intact. No erythema.   Psychiatric: He has a normal mood and affect.   Vitals reviewed.          Assessment:       Diagnosis Plan   1. Essential hypertension  Adult Transthoracic Echo Complete W/ Cont if Necessary Per Protocol   2. Palpitation       1.  Hypertension: Presently untreated.  The patient's electrocardiogram is significant for left ventricular hypertrophy with ST-T wave abnormalities.  I stressed the importance of the patient to take his medication.  We reviewed reviewed the potential serious complications associated with untreated hypertension.  I believe many of his symptoms would improve if he gets on his beta-blocker and brings his pressure down to your normal level.    In addition I am going to obtain an echocardiogram to follow-up on one he had in July 2017 that showed poor left ventricular function.  His examination does not indicate any evidence of left or right heart failure at this time.       Plan:       I appreciate the opportunity to see this patient in consultation

## 2019-04-05 ENCOUNTER — TELEPHONE (OUTPATIENT)
Dept: FAMILY MEDICINE CLINIC | Facility: CLINIC | Age: 44
End: 2019-04-05

## 2019-04-05 ENCOUNTER — OFFICE VISIT (OUTPATIENT)
Dept: FAMILY MEDICINE CLINIC | Facility: CLINIC | Age: 44
End: 2019-04-05

## 2019-04-05 DIAGNOSIS — R00.2 PALPITATION: Primary | ICD-10-CM

## 2019-04-05 NOTE — TELEPHONE ENCOUNTER
Mr Bryan was belligerent to  staff and he has no showed several appointments. I would like him discharged from the practice.

## 2019-04-11 ENCOUNTER — TELEPHONE (OUTPATIENT)
Dept: FAMILY MEDICINE CLINIC | Facility: CLINIC | Age: 44
End: 2019-04-11

## 2019-04-11 NOTE — TELEPHONE ENCOUNTER
I left a message on an identified  encouraging Teddy to go to [facility]  And explaining that I would certainly see him tomorrow but I would not be able to get him in with a psychiatrist any faster. I have advised [facility] emergency psychiatry and [facility]. I have encouraged him to get help now.    ----- Message from Milly Cisneros sent at 4/11/2019  3:54 PM EDT -----  Dr. Poon this is the patient that took a records release last week. He called to 4:00PM today.  States that he was in need of mental health. He went to the [facility] last night, had an appt for 9PM, no one saw him until 10:30.  He was told that he could receive out patient therapy, but they did not have a psychiatrist that could see him for 3-4 weeks. He said he left.  I do not know if he agreed to the treatment plan.  He said he has been calling around all day and no psychiatrist would see him soon.  I gave him the number for [facility] and [facility]. He was reluctant but told me he would call. He is asking to see Dr. Poon tomorrow.  [phone]

## 2019-04-12 ENCOUNTER — OFFICE VISIT (OUTPATIENT)
Dept: FAMILY MEDICINE CLINIC | Facility: CLINIC | Age: 44
End: 2019-04-12

## 2019-04-12 ENCOUNTER — TELEPHONE (OUTPATIENT)
Dept: FAMILY MEDICINE CLINIC | Facility: CLINIC | Age: 44
End: 2019-04-12

## 2019-04-12 VITALS
DIASTOLIC BLOOD PRESSURE: 96 MMHG | BODY MASS INDEX: 23.24 KG/M2 | WEIGHT: 166 LBS | SYSTOLIC BLOOD PRESSURE: 134 MMHG | HEART RATE: 77 BPM | OXYGEN SATURATION: 99 % | HEIGHT: 71 IN

## 2019-04-12 DIAGNOSIS — F32.A ANXIETY AND DEPRESSION: Primary | ICD-10-CM

## 2019-04-12 DIAGNOSIS — F41.9 ANXIETY AND DEPRESSION: Primary | ICD-10-CM

## 2019-04-12 PROCEDURE — 99214 OFFICE O/P EST MOD 30 MIN: CPT | Performed by: FAMILY MEDICINE

## 2019-04-12 RX ORDER — RISPERIDONE 0.25 MG/1
0.25 TABLET ORAL 2 TIMES DAILY
Qty: 60 TABLET | Refills: 1 | Status: SHIPPED | OUTPATIENT
Start: 2019-04-12

## 2019-04-12 RX ORDER — FLUOXETINE HYDROCHLORIDE 40 MG/1
40 CAPSULE ORAL DAILY
Qty: 30 CAPSULE | Refills: 3 | Status: SHIPPED | OUTPATIENT
Start: 2019-04-12

## 2019-04-12 NOTE — PROGRESS NOTES
"Subjective   Teddy King is a 43 y.o. male.     Chief Complaint   Patient presents with   • Anxiety   • Depression       HPI     Patient is accompanied by his mother today who also provides history with pt's consent.     Depression/Anxiety:  Patient reports worsening chronic depression and anxiety. He has been experiencing vertigo, palpitations, and disorientation. His mother states that the patient will appear very confused, his eyes will \"flicker\", and he will point around the room frantically during these episodes. She notes that he was complaining of pain in his left arm and head during the episode. The patient and his mother are worried because his brother has a hx of schizophrenia. Patient is followed by a therapist once every 2 months and he was advised to go to the [facility] for urgent psychiatric treatment. He was evaluated for depression/anxiety at the [facility] and he was offered admission to the outpatient treatment program. He declined because he was told he would not see a psychiatrist for about 1 week. He has been trying to find a psychiatrist but there is a long wait list. He would like to discuss medication options today until he is able to meet with a psychiatrist. He has previously taken risperidone for anxiety and associated auditory hallucinations. He did not tolerate zoloft due to fatigue. He notes that his biological father has responded very well to prozac. He denies any suicidal or homicidal ideations. His mother is visiting from [place] and the patient is considering going back home with her to [place] for psychiatric treatment for a few months.       Review of Systems   Constitutional: Negative for activity change, appetite change, fatigue and unexpected weight change.   HENT: Negative for congestion, sinus pain, sore throat and tinnitus.    Eyes: Negative for visual disturbance.   Respiratory: Negative for chest tightness, shortness of breath and wheezing.    Cardiovascular: Positive for " palpitations. Negative for chest pain and leg swelling.   Gastrointestinal: Negative for abdominal pain, diarrhea, nausea and vomiting.   Musculoskeletal: Negative for arthralgias, back pain and myalgias.   Skin: Negative for rash.   Allergic/Immunologic: Negative for environmental allergies and food allergies.   Neurological: Positive for dizziness (vertigo). Negative for tremors, numbness and headaches.   Psychiatric/Behavioral: Positive for confusion, dysphoric mood and hallucinations. Negative for self-injury, sleep disturbance and suicidal ideas. The patient is nervous/anxious.        Past Medical History:   Diagnosis Date   • Anxiety    • Chest pain    • Chronic systolic CHF (congestive heart failure) (CMS/HCC)    • Depression    • Heat stroke    • Hypertension    • Palpitation    • Rapid heart rate    • Sleep apnea    • Syncope    • Tachycardia    • Vertigo        Past Surgical History:   Procedure Laterality Date   • EAR TUBES     • WISDOM TOOTH EXTRACTION         Family History   Problem Relation Age of Onset   • Heart disease Father    • No Known Problems Sister    • Schizophrenia Brother    • Drug abuse Brother    • Heart attack Paternal Grandfather    • Heart disease Paternal Grandfather        No Known Allergies     Outpatient Medications Prior to Visit   Medication Sig Dispense Refill   • metoprolol succinate XL (TOPROL-XL) 25 MG 24 hr tablet Take 1 tablet by mouth Daily. 30 tablet 2     No facility-administered medications prior to visit.        Objective     Vitals:    04/12/19 1157   BP: 134/96   Pulse: 77   SpO2: 99%       Physical Exam   Constitutional: He appears well-developed and well-nourished.   HENT:   Head: Normocephalic and atraumatic.   Eyes: Conjunctivae and EOM are normal. Pupils are equal, round, and reactive to light.   Cardiovascular: Normal rate, regular rhythm, S1 normal and S2 normal. Exam reveals no gallop and no friction rub.   No murmur heard.  Pulses:       Radial pulses are  2+ on the right side, and 2+ on the left side.   Pulmonary/Chest: Effort normal and breath sounds normal. No respiratory distress. He has no wheezes. He has no rales.   Skin: Skin is warm and dry.   Psychiatric: His speech is normal. His mood appears anxious.   Tearful   Vitals reviewed.      ASSESSMENT/PLAN       Problem List Items Addressed This Visit     None      Visit Diagnoses     Anxiety and depression    -  Primary    Relevant Medications    Start FLUoxetine (PROZAC) 40 MG capsule    Resume risperiDONE (RISPERDAL) 0.25 MG tablet    Other Relevant Orders    Ambulatory Referral to Psychiatry  Pt strongly advised to return to the ER if mood worsens of if he develops SI, HI, or hallucinations        Follow up in 1 month if not able to establish care with Psychiatry      ICaroline, am scribing for, and in the presence of,Cleopatra Stevens MD. 4/12/2019 12:36 PM    ICleopatra MD   personally, performed the services described in this documentation, as scribed by,Caroline Haji, in my presence, and it is both accurate and complete.    Cleopatra Stevens MD  04/28/19  7:32 PM

## 2019-04-12 NOTE — TELEPHONE ENCOUNTER
----- Message from Aury Cohen sent at 4/12/2019  8:32 AM EDT -----  Milly,  We are terminating this patient. However, we are responsible for the next 30 days. Please call him and tell him she is not in the office today. Look at Monday or Tuesday and if he is in immediate need of treatment, go to ER.  Ann Argueta  ----- Message -----  From: Milly Cisneros  Sent: 4/11/2019   3:54 PM  To: Mario Poon MD, Aury Poon this is the patient that took a records release last week. He called to 4:00PM today.  States that he was in need of mental health. He went to the [facility] last night, had an appt for 9PM, no one saw him until 10:30.  He was told that he could receive out patient therapy, but they did not have a psychiatrist that could see him for 3-4 weeks. He said he left.  I do not know if he agreed to the treatment plan.  He said he has been calling around all day and no psychiatrist would see him soon.  I gave him the number for [facility] and [facility]. He was reluctant but told me he would call. He is asking to see Dr. Poon tomorrow.  [phone]

## 2019-10-01 ENCOUNTER — OFFICE VISIT (OUTPATIENT)
Dept: FAMILY MEDICINE CLINIC | Facility: CLINIC | Age: 44
End: 2019-10-01

## 2019-10-01 VITALS
DIASTOLIC BLOOD PRESSURE: 74 MMHG | SYSTOLIC BLOOD PRESSURE: 120 MMHG | HEART RATE: 80 BPM | OXYGEN SATURATION: 99 % | WEIGHT: 180 LBS | BODY MASS INDEX: 24.38 KG/M2 | RESPIRATION RATE: 16 BRPM | HEIGHT: 72 IN

## 2019-10-01 DIAGNOSIS — R42 VERTIGO: ICD-10-CM

## 2019-10-01 DIAGNOSIS — F10.10 ALCOHOL ABUSE: Primary | ICD-10-CM

## 2019-10-01 PROCEDURE — 99213 OFFICE O/P EST LOW 20 MIN: CPT | Performed by: NURSE PRACTITIONER

## 2019-10-01 RX ORDER — CLONIDINE HYDROCHLORIDE 0.1 MG/1
0.1 TABLET ORAL 3 TIMES DAILY
COMMUNITY
Start: 2019-08-13

## 2019-10-01 RX ORDER — DISULFIRAM 250 MG/1
250 TABLET ORAL EVERY MORNING
Refills: 1 | COMMUNITY
Start: 2019-07-25

## 2019-10-01 RX ORDER — CARBAMAZEPINE 200 MG/1
400 TABLET ORAL 2 TIMES DAILY
Refills: 1 | COMMUNITY
Start: 2019-08-01

## 2019-10-01 RX ORDER — BUSPIRONE HYDROCHLORIDE 10 MG/1
10 TABLET ORAL 2 TIMES DAILY
Refills: 1 | COMMUNITY
Start: 2019-08-01

## 2019-10-01 RX ORDER — RISPERIDONE 0.5 MG/1
0.5 TABLET ORAL DAILY
Refills: 0 | COMMUNITY
Start: 2019-08-20

## 2019-10-01 NOTE — PATIENT INSTRUCTIONS
What You Need To Know About Alcohol Abuse and Dependence, Adult  Alcohol is a widely available drug. People who use alcohol will consume it in varying amounts. People who drink alcohol in excess, and have behavior problems during and after drinking alcohol, may have what is called an alcohol use disorder. Alcohol abuse and alcohol dependence are the two main types of alcohol use disorders:  · Alcohol abuse is when you use alcohol too much or too often. You may use alcohol to make yourself feel happy or to reduce stress, but you may have a hard time setting a limit on the amount you drink.  · Alcohol dependence is when you use alcohol excessively for a period of time, and your body and brain chemistry changes as a result. This can make it hard to stop drinking because you may start to feel sick or feel different when you do not use alcohol.  How can alcohol abuse and dependence affect me?  Alcohol abuse and dependence can have a negative effect on your life. Excessive use of alcohol may lead to an addiction. You may feel like you need alcohol to function normally. You may drink alcohol before work in the morning, during the day, or as soon as you get home from work in the evening. These actions can result in:  · Poor performance at work.  · Losing your job.  · Financial problems.  · Car crashes or criminal charges from driving after drinking alcohol.  · Problems in your relationships with friends and family.  · Losing the trust and respect of co-workers, friends, and family.  Drinking heavily over a long period of time can permanently damage your body and brain, and can cause lifelong health issues, such as:  · Liver disease.  · Heart problems, high blood pressure, or stroke.  · Damage to your pancreas.  · Certain cancers.  · Decreased ability to fight infections.  · Numbness or tingling in hands or feet (neuropathy).  · Brain damage.  · Depression.  · Early (premature) death.  When your body craves alcohol, it is  easy to drink more than your body can handle. As a result, you may overdose. Alcohol overdose is a serious situation that requires hospitalization. It may lead to permanent injuries or death.  What are the benefits of avoiding alcohol use?  Limiting or avoiding alcohol can help you:  · Avoid risks to your body, brain, and relationships.  · Avoid the risk of abusing or becoming dependent on alcohol.  · Keep your mind and body healthy. As a result, you may be more likely to accomplish your life goals.  · Avoid permanent injury, organ damage, or death due to alcohol use.  What steps can I take to stop drinking?    · The best way to avoid alcohol abuse, dependence, and addiction is not to drink at all, or to drink measured amounts. Measured drinking means no more than 1 drink a day for nonpregnant women and 2 drinks a day for men. One drink equals 12 oz of beer, 5 oz of wine, or 1½ oz of hard liquor.  · Stop drinking if you have been drinking too much. This can be very hard to do if you are used to abusing alcohol. If you find it hard to stop drinking, talk about your experience with someone you trust. This person may be able to help you change your drinking behavior.  · Instead of drinking alcohol, do something else, like a hobby or exercise.  · Find healthy ways to cope with stress, such as exercise, meditation, or spending time with people you care about.  · In social gatherings and places where there may be alcohol, make intentional choices to drink non-alcohol beverages.  · If your family, co-workers, or friends drink, talk to them about supporting you in your efforts to stop drinking. Ask them not to drink around you. Spend more time with people who do not drink alcohol.  · If you think that you have an alcohol dependency problem:  ? Tell friends or family about your concerns.  ? Talk with your health care provider or another health professional about where to get help.  ? Work with a therapist and a chemical  dependency counselor.  ? Consider joining a support group for people who struggle with alcohol abuse, dependence, and addiction.  Where to find support  You can get support for preventing alcohol abuse, dependence, and addiction from:  · Your health care provider.  · Alcoholics Anonymous (AA): www.aa.org  · SMART Recovery: www.smartrecovery.org  · Local treatment centers or chemical dependency counselors.  Where to find more information  Learn more about alcohol abuse and dependence from:  · Centers for Disease Control and Prevention: www.cdc.gov/alcohol/fact-sheets/alcohol-use.htm  · National [place] on Alcohol Abuse and Alcoholism: www.niaaa.nih.gov/alcohol-health/overview-alcohol-consumption  · Local AA groups in your community.  Contact a health care provider if:  · You drink more or for longer than you intended, on more than one occasion.  · You tried to stop drinking or to cut back on how much you drink, but you were not able to.  · You often drink to the point of vomiting or passing out.  · You want to drink so badly that you cannot think about anything else.  · Drinking has created problems in your life, but you continue to drink.  · You keep drinking even though you feel anxious, depressed, or have experienced memory loss.  · You have stopped doing the things you used to enjoy in order to drink.  · You have to drink more than you used to in order to get the effect you want.  · You experience anxiety, sweating, nausea, shakiness, and trouble sleeping when you try to stop drinking.  · You have thoughts about hurting yourself or others.  If you ever feel like you may hurt yourself or others, or have thoughts about taking your own life, get help right away. You can go to your nearest emergency department or call:  · Your local emergency services (911 in the [place]  · A suicide crisis helpline, such as the National Suicide Prevention Lifeline at 1-344.602.3589. This is open 24 hours a day.  Summary  · Alcohol  is a widely available drug. Misusing, abusing, and becoming dependent on alcohol can cause many problems.  · It is important to measure and limit the amount of alcohol you consume. It is recommended to limit alcohol use to 1 drink a day for nonpregnant women and 2 drinks a day for men.  · The risks associated with drinking too much will have a direct negative impact on your work, relationships, and health.  · If you realize that you are having some challenges keeping your drinking under control, find some ways to change your behavior. Hobbies, self calming activities, exercise, or support groups can help.  · If you feel you need help with changing your drinking habits, talk with your health care provider, a good friend, or a therapist, or go to an AA group.  This information is not intended to replace advice given to you by your health care provider. Make sure you discuss any questions you have with your health care provider.  Document Released: 12/12/2017 Document Revised: 12/12/2017 Document Reviewed: 12/12/2017  [employer] Interactive Patient Education © 2019 [employer]

## 2019-10-01 NOTE — PROGRESS NOTES
Subjective   Teddy King is a 44 y.o. male.     History of Present Illness   Pt is here for dizziness f/u and return to work.   Has a long history of alcohol abuse and has been on disability since middle of February. He sees Dr Poon for his normal health issues and she is unaware of his alcohol abuse. He has cancelled his last 7 appointments here. He has been sober since Sept 22 and needs to go back to work. He has been attending his AA meetings    The following portions of the patient's history were reviewed and updated as appropriate: allergies, current medications, past family history, past medical history, past social history, past surgical history and problem list.    Review of Systems   Constitutional: Negative for activity change, appetite change, fatigue, fever and unexpected weight change.   Respiratory: Negative for cough and shortness of breath.    Neurological: Negative for dizziness and headaches.       Objective   Physical Exam   Constitutional: He is oriented to person, place, and time. He appears well-developed and well-nourished. No distress.   HENT:   Head: Normocephalic and atraumatic.   Right Ear: External ear normal.   Left Ear: External ear normal.   Mouth/Throat: Oropharynx is clear and moist.   Eyes: EOM are normal. Pupils are equal, round, and reactive to light.   Neck: Neck supple.   Cardiovascular: Normal rate and regular rhythm.   Pulmonary/Chest: Effort normal and breath sounds normal.   Lymphadenopathy:     He has no cervical adenopathy.   Neurological: He is alert and oriented to person, place, and time.   Skin: Skin is warm and dry.   Psychiatric: He has a normal mood and affect.   Nursing note and vitals reviewed.      Assessment/Plan   Teddy was seen today for dizziness and return to work.    Diagnoses and all orders for this visit:    Alcohol abuse    Vertigo      I explained to the pt that I would have to review his chart and would call him tomorrow

## 2019-10-10 ENCOUNTER — RESULTS ENCOUNTER (OUTPATIENT)
Dept: FAMILY MEDICINE CLINIC | Facility: CLINIC | Age: 44
End: 2019-10-10

## 2019-10-10 DIAGNOSIS — F10.10 ALCOHOL ABUSE: ICD-10-CM

## 2019-10-11 LAB — ETHANOL BLD GC-MCNC: 0.05 %
